# Patient Record
(demographics unavailable — no encounter records)

---

## 2024-11-27 NOTE — PHYSICAL EXAM
[Alert] : alert [Sclera] : the sclera and conjunctiva were normal [Hearing Threshold Finger Rub Not [place]] : hearing was normal [Normal Appearance] : the appearance of the neck was normal [No Respiratory Distress] : no respiratory distress [Respiration, Rhythm And Depth] : normal respiratory rhythm and effort [Heart Rate And Rhythm] : heart rate was normal and rhythm regular [Normal S1, S2] : normal S1 and S2 [Bowel Sounds] : normal bowel sounds [Abdomen Tenderness] : non-tender [No Masses] : no abdominal mass palpated [No CVA Tenderness] : no CVA  tenderness [Abnormal Walk] : normal gait [Normal Color / Pigmentation] : normal skin color and pigmentation [Oriented To Time, Place, And Person] : oriented to person, place, and time 16-Apr-2024 16-Apr-2024 09:04

## 2024-11-27 NOTE — REVIEW OF SYSTEMS
[Fever] : no fever [Chills] : no chills [Scleral Icterus (Yellow Eyes)] : no scleral icterus [Sore Throat] : no sore throat [Chest Pain] : no chest pain [Palpitations] : no palpitations [SOB on Exertion] : no shortness of breath during exertion [Constipation] : no constipation [Diarrhea] : no diarrhea [Limb Swelling] : no limb swelling [Itching] : no itching [Jaundice (yellowing of skin)] : no jaundice [Dizziness] : no dizziness [Depression] : no depression [Muscle Weakness] : no muscle weakness [Easy Bruising] : no tendency for easy bruising

## 2024-11-27 NOTE — HISTORY OF PRESENT ILLNESS
[FreeTextEntry1] : Patient is a 26-year-old female with history of trisomy 21, congenital heart disease s/p heart surgery and Crohn's disease followed previously at [place]. He had episode of pain and CT was concerning as there was an area of under distension in the colon. He had some constipation and blood per rectum but this is improved by Miralax.

## 2024-11-27 NOTE — ASSESSMENT
[FreeTextEntry1] : Patient is a 26-year-old female with history of trisomy 21, congenital heart disease s/p heart surgery and Crohn's disease followed previously at [place]. He had episode of pain and CT was concerning as there was an area of under distension in the colon. He had some constipation and blood per rectum but this is improved by Miralax.   H/O CD  Blood Per rectum CT with underdistension  Setup for Colonoscopy- Miralax prep

## 2025-01-22 NOTE — ASSESSMENT
[FreeTextEntry1] : Patient is a 26-year-old female with history of trisomy 21, congenital heart disease s/p heart surgery and Crohn's disease followed previously at [place]. He had episode of pain and CT was concerning as there was an area of under distension in the colon. He had some constipation and blood per rectum but this is improved by Miralax.   H/O CD  Blood Per rectum CT with underdistension  Setup for Colonoscopy- Miralax prep

## 2025-01-22 NOTE — REVIEW OF SYSTEMS
[Fever] : no fever [Chills] : no chills [Scleral Icterus (Yellow Eyes)] : no scleral icterus [Sore Throat] : no sore throat [Chest Pain] : no chest pain [Palpitations] : no palpitations [SOB on Exertion] : no shortness of breath during exertion [Constipation] : no constipation [Diarrhea] : no diarrhea [Limb Swelling] : no limb swelling [Itching] : no itching [Dizziness] : no dizziness [Jaundice (yellowing of skin)] : no jaundice [Depression] : no depression [Muscle Weakness] : no muscle weakness [Easy Bruising] : no tendency for easy bruising

## 2025-01-22 NOTE — ASSESSMENT
[FreeTextEntry1] : Patient is a 26-year-old female with history of trisomy 21, congenital heart disease s/p heart surgery and Crohn's disease followed previously at [place]. He had episode of pain and CT was concerning as there was an area of under distension in the colon. He had some constipation and blood per rectum but this is improved by Miralax.   H/O CD  Blood Per rectum CT with underdistension  Setup for Colonoscopy- Miralax prep

## 2025-01-22 NOTE — REASON FOR VISIT
[Home] : at home, [unfilled] , at the time of the visit. [Medical Office: ([facility])___] : at the medical office located in  [Parents] : parents [Post-op/Procedure: _________] : a [unfilled] post-op/procedure visit [FreeTextEntry3] : Waqar Pang  [FreeTextEntry4] : Yasmeen CUEVAS

## 2025-01-22 NOTE — REASON FOR VISIT
[Home] : at home, [unfilled] , at the time of the visit. [Medical Office: ([facility])___] : at the medical office located in  [Parents] : parents [Post-op/Procedure: _________] : a [unfilled] post-op/procedure visit [FreeTextEntry3] : Waqar Pang  [FreeTextEntry4] : Yasmeen CUEVAS

## 2025-02-26 NOTE — PHYSICAL EXAM
[Alert] : alert [No Acute Distress] : no acute distress [Well Developed] : well developed [Well Nourished] : well nourished [Sclera] : the sclera and conjunctiva were normal [Hearing Threshold Finger Rub Not [place]] : hearing was normal [Normal Appearance] : the appearance of the neck was normal [No Respiratory Distress] : no respiratory distress [No Acc Muscle Use] : no accessory muscle use [Respiration, Rhythm And Depth] : normal respiratory rhythm and effort [Auscultation Breath Sounds / Voice Sounds] : lungs were clear to auscultation bilaterally [Heart Rate And Rhythm] : heart rate was normal and rhythm regular [Normal S1, S2] : normal S1 and S2 [Bowel Sounds] : normal bowel sounds [Abdomen Tenderness] : non-tender [No Masses] : no abdominal mass palpated [Abdomen Soft] : soft [Abnormal Walk] : normal gait [Normal Color / Pigmentation] : normal skin color and pigmentation [Oriented To Time, Place, And Person] : oriented to person, place, and time

## 2025-02-26 NOTE — PHYSICAL EXAM
[Alert] : alert [No Acute Distress] : no acute distress [Well Developed] : well developed [Well Nourished] : well nourished [Sclera] : the sclera and conjunctiva were normal [Hearing Threshold Finger Rub Not [place]] : hearing was normal [Normal Appearance] : the appearance of the neck was normal [No Respiratory Distress] : no respiratory distress [No Acc Muscle Use] : no accessory muscle use [Respiration, Rhythm And Depth] : normal respiratory rhythm and effort [Auscultation Breath Sounds / Voice Sounds] : lungs were clear to auscultation bilaterally [Heart Rate And Rhythm] : heart rate was normal and rhythm regular [Normal S1, S2] : normal S1 and S2 [Bowel Sounds] : normal bowel sounds [Abdomen Tenderness] : non-tender [No Masses] : no abdominal mass palpated [Abdomen Soft] : soft [Abnormal Walk] : normal gait [Normal Color / Pigmentation] : normal skin color and pigmentation [Oriented To Time, Place, And Person] : oriented to person, place, and time

## 2025-02-26 NOTE — PHYSICAL EXAM
[Alert] : alert [No Acute Distress] : no acute distress [Well Developed] : well developed [Well Nourished] : well nourished [Sclera] : the sclera and conjunctiva were normal [Hearing Threshold Finger Rub Not [place]] : hearing was normal [Normal Appearance] : the appearance of the neck was normal [No Respiratory Distress] : no respiratory distress [No Acc Muscle Use] : no accessory muscle use [Respiration, Rhythm And Depth] : normal respiratory rhythm and effort [Auscultation Breath Sounds / Voice Sounds] : lungs were clear to auscultation bilaterally [Heart Rate And Rhythm] : heart rate was normal and rhythm regular [Normal S1, S2] : normal S1 and S2 [Bowel Sounds] : normal bowel sounds [Abdomen Tenderness] : non-tender [No Masses] : no abdominal mass palpated [Abdomen Soft] : soft [Abnormal Walk] : normal gait [Normal Color / Pigmentation] : normal skin color and pigmentation [Oriented To Time, Place, And Person] : oriented to person, place, and time

## 2025-03-05 NOTE — ASSESSMENT
[FreeTextEntry1] : Patient is a 26-year-old female with history of trisomy 21, congenital heart disease s/p heart surgery and Crohn's disease followed previously at [place] for F/U post colonoscopy.       Mild to moderate Crohn's Disease - Results of colonoscopy discussed with patient's parents today - Start mesalamine and budesonide for now - Will check Hepatitis B and C serologies and Quantiferon TB test - Pt to have testicular hernia repair soon - F/U in May after recovered from surgery - Plan to switch to Rinvoq on next visit for better control of Crohn's Disease since it is an oral option that is better tolerated  Patient's p[arents verbalized understanding and agreed with the plan.

## 2025-03-05 NOTE — ASSESSMENT
[FreeTextEntry1] : Patient is a 26-year-old female with history of trisomy 21, congenital heart disease s/p heart surgery and Crohn's disease followed previously at [place] for F/U post colonoscopy.       Mild to moderate Crohn's Disease - Results of colonoscopy discussed with patient's parents today - Start mesalamine and budesonide for now - Will check Hepatitis B and C serologies and Quantiferon TB test - Pt to have testicular hernia repair soon - F/U in May after recovered from surgery - Plan to switch to Rinvoq on next visit for better control of Crohn's Disease since it is an oral option that is better tolerated  Patient's p[arents verbalized understanding and agreed with the plan.

## 2025-03-05 NOTE — ASSESSMENT
[FreeTextEntry1] : Patient is a 26-year-old female with history of trisomy 21, congenital heart disease s/p heart surgery and Crohn's disease followed previously at [place] for F/U post colonoscopy.       Mild to moderate Crohn's Disease - Results of colonoscopy discussed with patient's parents today - Start mesalamine and budesonide for now - Will check Hepatitis B and C serologies and Quantiferon TB test - Pt to have testicular hernia repair soon - F/U in May after recovered from surgery - Plan to switch to Rinvoq on next visit for better control of Crohn's Disease since it is an oral option that is better tolerated  Patient's p[arents verbalized understanding and agreed with the plan.

## 2025-05-14 NOTE — ASSESSMENT
[FreeTextEntry1] : Patient is a 26-year-old female with history of trisomy 21, congenital heart disease s/p heart surgery and Crohn's disease followed previously at [place] for F/U post colonoscopy. Patient has been doing well. He is s/p hernia repair and tolerated it well. He is having normal bowel routine and not having blood.    Mild to moderate Crohn's Disease - Results of colonoscopy discussed with patient's parents today - Continue mesalamine and budesonide for now - Wants to hold off on Rinvoq for now - Follow up 1 year - Discussed plan with HCP/ Father in detail, can always call for any changes sooner

## 2025-05-14 NOTE — PHYSICAL EXAM
[Alert] : alert [No Acute Distress] : no acute distress [Well Developed] : well developed [Well Nourished] : well nourished [Sclera] : the sclera and conjunctiva were normal [Hearing Threshold Finger Rub Not [place]] : hearing was normal [Normal Appearance] : the appearance of the neck was normal [No Respiratory Distress] : no respiratory distress [No Acc Muscle Use] : no accessory muscle use [Respiration, Rhythm And Depth] : normal respiratory rhythm and effort [Auscultation Breath Sounds / Voice Sounds] : lungs were clear to auscultation bilaterally [Heart Rate And Rhythm] : heart rate was normal and rhythm regular [Normal S1, S2] : normal S1 and S2 [Bowel Sounds] : normal bowel sounds [Abdomen Tenderness] : non-tender [No Masses] : no abdominal mass palpated [Abdomen Soft] : soft [Abnormal Walk] : normal gait [Normal Color / Pigmentation] : normal skin color and pigmentation [Oriented To Time, Place, And Person] : oriented to person, place, and time